# Patient Record
Sex: MALE | Race: OTHER | HISPANIC OR LATINO | URBAN - METROPOLITAN AREA
[De-identification: names, ages, dates, MRNs, and addresses within clinical notes are randomized per-mention and may not be internally consistent; named-entity substitution may affect disease eponyms.]

---

## 2019-08-22 ENCOUNTER — EMERGENCY (EMERGENCY)
Facility: HOSPITAL | Age: 24
LOS: 1 days | Discharge: HOME | End: 2019-08-22
Attending: EMERGENCY MEDICINE
Payer: SELF-PAY

## 2019-08-22 VITALS
DIASTOLIC BLOOD PRESSURE: 81 MMHG | RESPIRATION RATE: 20 BRPM | TEMPERATURE: 99 F | OXYGEN SATURATION: 99 % | SYSTOLIC BLOOD PRESSURE: 119 MMHG | HEIGHT: 67 IN | HEART RATE: 72 BPM | WEIGHT: 175.05 LBS

## 2019-08-22 PROCEDURE — 99282 EMERGENCY DEPT VISIT SF MDM: CPT

## 2019-08-22 PROCEDURE — T1013: CPT

## 2019-08-22 NOTE — ED PROVIDER NOTE - PHYSICAL EXAMINATION
Right hand: 2cm laceration to the right thumb, wound has healed/scabbed over, no active bleeding, distal sensation intact, cap refill <2sec, radial pulse 2+

## 2019-08-22 NOTE — ED PROVIDER NOTE - OBJECTIVE STATEMENT
25yo male with no significant PMHx comes in due to laceration. Pt reports he cut his thumb 23yo male with no significant PMHx comes in due to laceration. Pt reports he cut his right thumb on Sunday while cutting a piece of wood with a boxcutter. Pt thought it would heal on it's own but it hasn't. Pt reports he is UTD on tetanus. Pt denies f/c, n/v, decreased sensation, DROM.

## 2019-08-22 NOTE — ED ADULT NURSE NOTE - NSIMPLEMENTINTERV_GEN_ALL_ED
Implemented All Universal Safety Interventions:  Joaquin to call system. Call bell, personal items and telephone within reach. Instruct patient to call for assistance. Room bathroom lighting operational. Non-slip footwear when patient is off stretcher. Physically safe environment: no spills, clutter or unnecessary equipment. Stretcher in lowest position, wheels locked, appropriate side rails in place.

## 2019-08-22 NOTE — ED ADULT TRIAGE NOTE - CHIEF COMPLAINT QUOTE
cut right thumb while using  on Monday. states tetanus UTD Size Of Lesion In Cm: 0.2 Biopsy Method: Personna blade Curettage Text: The wound bed was treated with curettage after the biopsy was performed. Electrodesiccation Text: The wound bed was treated with electrodesiccation after the biopsy was performed. Destruction After The Procedure: Yes Type Of Destruction Used: Curettage Additional Anesthesia Volume In Cc (Will Not Render If 0): 0 Dressing: bandage Electrodesiccation And Curettage Text: The wound bed was treated with electrodesiccation and curettage after the biopsy was performed. Biopsy Type: H and E Render Post-Care Instructions In Note?: no Lab Facility: [id number] Silver Nitrate Text: The wound bed was treated with silver nitrate after the biopsy was performed. Wound Care: Vaseline Detail Level: Detailed Billing Type: Third-Party Bill Cryotherapy Text: The wound bed was treated with cryotherapy after the biopsy was performed. Lab: [id number] Path Notes (To The Dermatopathologist): 2mm Post-Care Instructions: I reviewed with the patient in detail post-care instructions. Patient is to keep the biopsy site dry overnight, and then apply bacitracin twice daily until healed. Patient may apply hydrogen peroxide soaks to remove any crusting. Consent: Verbal consent was obtained and risks were reviewed including but not limited to scarring, infection, bleeding, scabbing, incomplete removal, nerve damage and allergy to anesthesia. Hemostasis: Electrocautery Anesthesia Volume In Cc (Will Not Render If 0): 0.5 Anesthesia Type: 1% lidocaine with epinephrine and a 1:10 solution of 8.4% sodium bicarbonate Notification Instructions: Patient will be notified of biopsy results. However, patient instructed to call the office if not contacted within 2 weeks.

## 2019-08-22 NOTE — ED PROVIDER NOTE - CLINICAL SUMMARY MEDICAL DECISION MAKING FREE TEXT BOX
Pt comes in due to laceration of finger sustained 4 days ago, wound already healing. Wound irrigated and steri-strips applied. Pt educated on wound care and to follow up with PCP.

## 2019-08-22 NOTE — ED PROVIDER NOTE - ATTENDING CONTRIBUTION TO CARE
pt cut finger with  4 days ago.  miniml pain no pus, but can in due to laceration not closing  no pmhx  pe  ncat  r thumb with 1 cm laceration, clot in middel of wound edges  distal nv intact  nl cap refill from  imp laceration--too long since laceration occurred to close with sutures  will irrigate and close with steri strips

## 2022-12-31 ENCOUNTER — EMERGENCY (EMERGENCY)
Facility: HOSPITAL | Age: 27
LOS: 1 days | Discharge: HOME | End: 2022-12-31
Attending: STUDENT IN AN ORGANIZED HEALTH CARE EDUCATION/TRAINING PROGRAM
Payer: COMMERCIAL

## 2022-12-31 VITALS
RESPIRATION RATE: 18 BRPM | DIASTOLIC BLOOD PRESSURE: 71 MMHG | TEMPERATURE: 98 F | HEART RATE: 76 BPM | SYSTOLIC BLOOD PRESSURE: 135 MMHG | OXYGEN SATURATION: 99 %

## 2022-12-31 VITALS
TEMPERATURE: 98 F | RESPIRATION RATE: 18 BRPM | WEIGHT: 164.91 LBS | OXYGEN SATURATION: 98 % | DIASTOLIC BLOOD PRESSURE: 71 MMHG | SYSTOLIC BLOOD PRESSURE: 154 MMHG

## 2022-12-31 PROCEDURE — 93010 ELECTROCARDIOGRAM REPORT: CPT

## 2022-12-31 PROCEDURE — 99284 EMERGENCY DEPT VISIT MOD MDM: CPT

## 2022-12-31 NOTE — ED PROVIDER NOTE - OBJECTIVE STATEMENT
27y M w/ no significant PMH, BIBEMS for medical evaluation. Pt says he had been drinking beer at a club tonight, and while waiting for an Uber lost his balance and fell. Denies hitting his head or LOC. Denies any complaints at this time.

## 2022-12-31 NOTE — ED PROVIDER NOTE - PHYSICAL EXAMINATION
Constitutional: Awake, alert, in no acute distress  Eyes: no scleral icterus  HENT: normocephalic, atraumatic, moist oral mucosa  Neck: supple, no tenderness  CV: RRR, no murmur  Pulm: non-labored respirations, CTAB  Abdomen: soft, non-tender, non-distended  Extremities: no edema, no deformity  Skin: no rash, no jaundice  Neuro: AAOx3, moving all extremities equally, ambulatory with steady gait

## 2022-12-31 NOTE — ED PROVIDER NOTE - PATIENT PORTAL LINK FT
You can access the FollowMyHealth Patient Portal offered by [facility] by registering at the following website: [email or web address]. By joining [facility]’s FollowMyHealth portal, you will also be able to view your health information using other applications (apps) compatible with our system.

## 2022-12-31 NOTE — ED PROVIDER NOTE - NSFOLLOWUPINSTRUCTIONS_ED_ALL_ED_FT
Follow up with your primary care doctor.  Return to the emergency room for any new or worsening issues.

## 2022-12-31 NOTE — ED PROVIDER NOTE - CLINICAL SUMMARY MEDICAL DECISION MAKING FREE TEXT BOX
27y M presents for alcohol intoxication. States that he fell prior to arrival; however no signs of trauma on exam. Pt AAOx4, ambulatory with steady gait, clinically sober at this time. Stable for discharge.

## 2022-12-31 NOTE — ED ADULT TRIAGE NOTE - CHIEF COMPLAINT QUOTE
Patient BIBEMS after drinking too much alcohol at a club this evening. patient is A&Ox3 and states he wants to go home.

## 2023-01-02 PROCEDURE — 99285 EMERGENCY DEPT VISIT HI MDM: CPT

## 2023-01-02 PROCEDURE — 93005 ELECTROCARDIOGRAM TRACING: CPT

## 2023-05-31 ENCOUNTER — INPATIENT (INPATIENT)
Facility: HOSPITAL | Age: 28
LOS: 2 days | Discharge: HOME | DRG: 948 | End: 2023-06-03
Attending: FAMILY MEDICINE | Admitting: STUDENT IN AN ORGANIZED HEALTH CARE EDUCATION/TRAINING PROGRAM
Payer: COMMERCIAL

## 2023-05-31 PROCEDURE — 99285 EMERGENCY DEPT VISIT HI MDM: CPT

## 2023-06-01 VITALS
DIASTOLIC BLOOD PRESSURE: 77 MMHG | SYSTOLIC BLOOD PRESSURE: 136 MMHG | OXYGEN SATURATION: 98 % | TEMPERATURE: 98 F | RESPIRATION RATE: 18 BRPM | HEART RATE: 72 BPM | WEIGHT: 206.79 LBS

## 2023-06-01 DIAGNOSIS — E87.6 HYPOKALEMIA: ICD-10-CM

## 2023-06-01 PROBLEM — Z78.9 OTHER SPECIFIED HEALTH STATUS: Chronic | Status: ACTIVE | Noted: 2022-12-31

## 2023-06-01 LAB
A1C WITH ESTIMATED AVERAGE GLUCOSE RESULT: 5.2 % — SIGNIFICANT CHANGE UP (ref 4–5.6)
ALBUMIN SERPL ELPH-MCNC: 4.2 G/DL — SIGNIFICANT CHANGE UP (ref 3.3–5.2)
ALBUMIN SERPL ELPH-MCNC: 4.6 G/DL — SIGNIFICANT CHANGE UP (ref 3.3–5.2)
ALP SERPL-CCNC: 61 U/L — SIGNIFICANT CHANGE UP (ref 40–120)
ALP SERPL-CCNC: 72 U/L — SIGNIFICANT CHANGE UP (ref 40–120)
ALT FLD-CCNC: 41 U/L — HIGH
ALT FLD-CCNC: 48 U/L — HIGH
ANION GAP SERPL CALC-SCNC: 15 MMOL/L — SIGNIFICANT CHANGE UP (ref 5–17)
ANISOCYTOSIS BLD QL: SLIGHT — SIGNIFICANT CHANGE UP
APTT BLD: 27.6 SEC — SIGNIFICANT CHANGE UP (ref 27.5–35.5)
AST SERPL-CCNC: 26 U/L — SIGNIFICANT CHANGE UP
AST SERPL-CCNC: 27 U/L — SIGNIFICANT CHANGE UP
BASOPHILS # BLD AUTO: 0 K/UL — SIGNIFICANT CHANGE UP (ref 0–0.2)
BASOPHILS NFR BLD AUTO: 0 % — SIGNIFICANT CHANGE UP (ref 0–2)
BILIRUB SERPL-MCNC: 0.4 MG/DL — SIGNIFICANT CHANGE UP (ref 0.4–2)
BILIRUB SERPL-MCNC: 0.6 MG/DL — SIGNIFICANT CHANGE UP (ref 0.4–2)
BUN SERPL-MCNC: 12.6 MG/DL — SIGNIFICANT CHANGE UP (ref 8–20)
BUN SERPL-MCNC: 12.9 MG/DL — SIGNIFICANT CHANGE UP (ref 8–20)
CALCIUM SERPL-MCNC: 8.5 MG/DL — SIGNIFICANT CHANGE UP (ref 8.4–10.5)
CALCIUM SERPL-MCNC: 9 MG/DL — SIGNIFICANT CHANGE UP (ref 8.4–10.5)
CHLORIDE SERPL-SCNC: 101 MMOL/L — SIGNIFICANT CHANGE UP (ref 96–108)
CHLORIDE SERPL-SCNC: 104 MMOL/L — SIGNIFICANT CHANGE UP (ref 96–108)
CK MB CFR SERPL CALC: 4 NG/ML — SIGNIFICANT CHANGE UP (ref 0–6.7)
CK SERPL-CCNC: 507 U/L — HIGH (ref 30–200)
CO2 SERPL-SCNC: 21 MMOL/L — LOW (ref 22–29)
CO2 SERPL-SCNC: 21 MMOL/L — LOW (ref 22–29)
CREAT SERPL-MCNC: 0.7 MG/DL — SIGNIFICANT CHANGE UP (ref 0.5–1.3)
CREAT SERPL-MCNC: 0.82 MG/DL — SIGNIFICANT CHANGE UP (ref 0.5–1.3)
EGFR: 123 ML/MIN/1.73M2 — SIGNIFICANT CHANGE UP
EGFR: 129 ML/MIN/1.73M2 — SIGNIFICANT CHANGE UP
EOSINOPHIL # BLD AUTO: 0 K/UL — SIGNIFICANT CHANGE UP (ref 0–0.5)
EOSINOPHIL NFR BLD AUTO: 0 % — SIGNIFICANT CHANGE UP (ref 0–6)
ESTIMATED AVERAGE GLUCOSE: 103 MG/DL — SIGNIFICANT CHANGE UP (ref 68–114)
GLUCOSE SERPL-MCNC: 125 MG/DL — HIGH (ref 70–99)
GLUCOSE SERPL-MCNC: 177 MG/DL — HIGH (ref 70–99)
HCT VFR BLD CALC: 45.2 % — SIGNIFICANT CHANGE UP (ref 39–50)
HGB BLD-MCNC: 16.3 G/DL — SIGNIFICANT CHANGE UP (ref 13–17)
INR BLD: 1.17 RATIO — HIGH (ref 0.88–1.16)
LYMPHOCYTES # BLD AUTO: 10.2 % — LOW (ref 13–44)
LYMPHOCYTES # BLD AUTO: 3.33 K/UL — HIGH (ref 1–3.3)
MAGNESIUM SERPL-MCNC: 2 MG/DL — SIGNIFICANT CHANGE UP (ref 1.6–2.6)
MANUAL SMEAR VERIFICATION: SIGNIFICANT CHANGE UP
MCHC RBC-ENTMCNC: 31 PG — SIGNIFICANT CHANGE UP (ref 27–34)
MCHC RBC-ENTMCNC: 36.1 GM/DL — HIGH (ref 32–36)
MCV RBC AUTO: 85.9 FL — SIGNIFICANT CHANGE UP (ref 80–100)
MONOCYTES # BLD AUTO: 1.21 K/UL — HIGH (ref 0–0.9)
MONOCYTES NFR BLD AUTO: 3.7 % — SIGNIFICANT CHANGE UP (ref 2–14)
NEUTROPHILS # BLD AUTO: 27.19 K/UL — HIGH (ref 1.8–7.4)
NEUTROPHILS NFR BLD AUTO: 83.3 % — HIGH (ref 43–77)
PHOSPHATE SERPL-MCNC: 1.9 MG/DL — LOW (ref 2.4–4.7)
PLAT MORPH BLD: NORMAL — SIGNIFICANT CHANGE UP
PLATELET # BLD AUTO: 363 K/UL — SIGNIFICANT CHANGE UP (ref 150–400)
POLYCHROMASIA BLD QL SMEAR: SIGNIFICANT CHANGE UP
POTASSIUM SERPL-MCNC: 2.3 MMOL/L — CRITICAL LOW (ref 3.5–5.3)
POTASSIUM SERPL-MCNC: 2.7 MMOL/L — CRITICAL LOW (ref 3.5–5.3)
POTASSIUM SERPL-MCNC: 4 MMOL/L — SIGNIFICANT CHANGE UP (ref 3.5–5.3)
POTASSIUM SERPL-SCNC: 2.3 MMOL/L — CRITICAL LOW (ref 3.5–5.3)
POTASSIUM SERPL-SCNC: 2.7 MMOL/L — CRITICAL LOW (ref 3.5–5.3)
POTASSIUM SERPL-SCNC: 4 MMOL/L — SIGNIFICANT CHANGE UP (ref 3.5–5.3)
PROT SERPL-MCNC: 7.1 G/DL — SIGNIFICANT CHANGE UP (ref 6.6–8.7)
PROT SERPL-MCNC: 7.6 G/DL — SIGNIFICANT CHANGE UP (ref 6.6–8.7)
PROTHROM AB SERPL-ACNC: 13.6 SEC — HIGH (ref 10.5–13.4)
RBC # BLD: 5.26 M/UL — SIGNIFICANT CHANGE UP (ref 4.2–5.8)
RBC # FLD: 12 % — SIGNIFICANT CHANGE UP (ref 10.3–14.5)
RBC BLD AUTO: ABNORMAL
SMUDGE CELLS # BLD: PRESENT — SIGNIFICANT CHANGE UP
SODIUM SERPL-SCNC: 138 MMOL/L — SIGNIFICANT CHANGE UP (ref 135–145)
SODIUM SERPL-SCNC: 140 MMOL/L — SIGNIFICANT CHANGE UP (ref 135–145)
VARIANT LYMPHS # BLD: 2.8 % — SIGNIFICANT CHANGE UP (ref 0–6)
WBC # BLD: 32.64 K/UL — HIGH (ref 3.8–10.5)
WBC # FLD AUTO: 32.64 K/UL — HIGH (ref 3.8–10.5)

## 2023-06-01 PROCEDURE — 99223 1ST HOSP IP/OBS HIGH 75: CPT

## 2023-06-01 PROCEDURE — G1004: CPT

## 2023-06-01 PROCEDURE — 70450 CT HEAD/BRAIN W/O DYE: CPT | Mod: 26,MG

## 2023-06-01 PROCEDURE — 93010 ELECTROCARDIOGRAM REPORT: CPT | Mod: 76

## 2023-06-01 PROCEDURE — 93306 TTE W/DOPPLER COMPLETE: CPT | Mod: 26

## 2023-06-01 RX ORDER — POTASSIUM CHLORIDE 20 MEQ
10 PACKET (EA) ORAL
Refills: 0 | Status: COMPLETED | OUTPATIENT
Start: 2023-06-01 | End: 2023-06-01

## 2023-06-01 RX ORDER — ACETAMINOPHEN 500 MG
1000 TABLET ORAL ONCE
Refills: 0 | Status: COMPLETED | OUTPATIENT
Start: 2023-06-01 | End: 2023-06-01

## 2023-06-01 RX ORDER — ATORVASTATIN CALCIUM 80 MG/1
80 TABLET, FILM COATED ORAL AT BEDTIME
Refills: 0 | Status: DISCONTINUED | OUTPATIENT
Start: 2023-06-01 | End: 2023-06-03

## 2023-06-01 RX ORDER — POTASSIUM PHOSPHATE, MONOBASIC POTASSIUM PHOSPHATE, DIBASIC 236; 224 MG/ML; MG/ML
30 INJECTION, SOLUTION INTRAVENOUS ONCE
Refills: 0 | Status: COMPLETED | OUTPATIENT
Start: 2023-06-01 | End: 2023-06-01

## 2023-06-01 RX ORDER — MAGNESIUM SULFATE 500 MG/ML
2 VIAL (ML) INJECTION ONCE
Refills: 0 | Status: COMPLETED | OUTPATIENT
Start: 2023-06-01 | End: 2023-06-01

## 2023-06-01 RX ORDER — ASPIRIN/CALCIUM CARB/MAGNESIUM 324 MG
81 TABLET ORAL DAILY
Refills: 0 | Status: DISCONTINUED | OUTPATIENT
Start: 2023-06-01 | End: 2023-06-02

## 2023-06-01 RX ORDER — PANTOPRAZOLE SODIUM 20 MG/1
40 TABLET, DELAYED RELEASE ORAL
Refills: 0 | Status: DISCONTINUED | OUTPATIENT
Start: 2023-06-01 | End: 2023-06-03

## 2023-06-01 RX ORDER — SODIUM CHLORIDE 9 MG/ML
1000 INJECTION, SOLUTION INTRAVENOUS
Refills: 0 | Status: DISCONTINUED | OUTPATIENT
Start: 2023-06-01 | End: 2023-06-03

## 2023-06-01 RX ORDER — POTASSIUM CHLORIDE 20 MEQ
40 PACKET (EA) ORAL ONCE
Refills: 0 | Status: COMPLETED | OUTPATIENT
Start: 2023-06-01 | End: 2023-06-01

## 2023-06-01 RX ORDER — POTASSIUM CHLORIDE 20 MEQ
40 PACKET (EA) ORAL EVERY 4 HOURS
Refills: 0 | Status: COMPLETED | OUTPATIENT
Start: 2023-06-01 | End: 2023-06-01

## 2023-06-01 RX ORDER — KETOROLAC TROMETHAMINE 30 MG/ML
30 SYRINGE (ML) INJECTION ONCE
Refills: 0 | Status: DISCONTINUED | OUTPATIENT
Start: 2023-06-01 | End: 2023-06-01

## 2023-06-01 RX ADMIN — Medication 40 MILLIEQUIVALENT(S): at 11:41

## 2023-06-01 RX ADMIN — SODIUM CHLORIDE 75 MILLILITER(S): 9 INJECTION, SOLUTION INTRAVENOUS at 14:17

## 2023-06-01 RX ADMIN — Medication 81 MILLIGRAM(S): at 11:41

## 2023-06-01 RX ADMIN — POTASSIUM PHOSPHATE, MONOBASIC POTASSIUM PHOSPHATE, DIBASIC 83.33 MILLIMOLE(S): 236; 224 INJECTION, SOLUTION INTRAVENOUS at 12:04

## 2023-06-01 RX ADMIN — Medication 100 MILLIEQUIVALENT(S): at 05:00

## 2023-06-01 RX ADMIN — Medication 100 MILLIEQUIVALENT(S): at 06:01

## 2023-06-01 RX ADMIN — Medication 100 MILLIEQUIVALENT(S): at 08:48

## 2023-06-01 RX ADMIN — Medication 30 MILLIGRAM(S): at 00:53

## 2023-06-01 RX ADMIN — Medication 400 MILLIGRAM(S): at 01:17

## 2023-06-01 RX ADMIN — PANTOPRAZOLE SODIUM 40 MILLIGRAM(S): 20 TABLET, DELAYED RELEASE ORAL at 11:41

## 2023-06-01 RX ADMIN — Medication 40 MILLIEQUIVALENT(S): at 14:18

## 2023-06-01 RX ADMIN — Medication 25 GRAM(S): at 05:00

## 2023-06-01 RX ADMIN — Medication 40 MILLIEQUIVALENT(S): at 05:00

## 2023-06-01 RX ADMIN — ATORVASTATIN CALCIUM 80 MILLIGRAM(S): 80 TABLET, FILM COATED ORAL at 21:34

## 2023-06-01 NOTE — ED PROVIDER NOTE - ALLERGIC/IMMUNOLOGIC NEGATIVE STATEMENT, MLM
FBS wnl but A1C in IFG range, urged low sugar/carb diet and exercise and mild wgt loss, recheck in 6 mos no dermatitis, no environmental allergies, no food allergies, no immunosuppressive disorder, and no pruritus.

## 2023-06-01 NOTE — OCCUPATIONAL THERAPY INITIAL EVALUATION ADULT - RANGE OF MOTION EXAMINATION, UPPER EXTREMITY
decreased AROM at all planes of shoulder movement/bilateral UE Passive ROM was WNL (within normal limits)

## 2023-06-01 NOTE — ED ADULT NURSE REASSESSMENT NOTE - NS ED NURSE REASSESS COMMENT FT1
pt is stable, placed on monitor,, vitals are within normal limits, repOrt given to CDU nurse Saba , safety maintained

## 2023-06-01 NOTE — ED PROVIDER NOTE - CLINICAL SUMMARY MEDICAL DECISION MAKING FREE TEXT BOX
patient is leukocytosis with neutrophil predominance noted likely secondary to his prednisone use however I cannot explain the hypokalemia he has no vomiting no diarrhea no diuretic use and is associated with a prolonged QT QTc of 618 ms patient still with right upper extremity weakness likely secondary to the hypokalemia versus steroid myopathy based on prolonged QT need for vast electrolyte replacement will require hospital admission for correction of QTc and monitoring of his right upper extremity weakness patient and patient's significant other at bedside agree this plan of care

## 2023-06-01 NOTE — H&P ADULT - HISTORY OF PRESENT ILLNESS
27 yr old M with Alcohol use went to [facility] on 5/[facility] with cough and was given tessalon and prednisone. He has been taking it. Now presents with c/o weakness in b/l legs and R arm d/t pain. pt also c/o lower abd pain. Per significant other  pt was told he had something wrong with his liver, in the past. Pain started around last evening when he was coming home from work and wasn't able to drive and needed help showering when he got home.   In ED- NIH 2, CTH neg. treated as steroid myopathy.    SH- Alcoholism, denies other habits  FH-  27 yr old M with Alcohol use went to [facility] on 5/21 with cough and was given tessalon and prednisone. He has been taking it. Now presents with c/o weakness in b/l legs and R arm d/t pain. pt also c/o lower abd pain. Per significant other  pt was told he had something wrong with his liver, in the past. Pain started around last evening when he was coming home from work and wasn't able to drive and needed help showering when he got home.   In ED- NIH 2, CTH neg. treated as steroid myopathy.    SH- Alcoholism, denies other habits  FH- Denies medical condition in family

## 2023-06-01 NOTE — ED ADULT TRIAGE NOTE - CHIEF COMPLAINT QUOTE
[facility] for sob, cough, given tessalon, prednisone. pt also c/o R sided upper and lower extremity weakness with numbness at 2pm

## 2023-06-01 NOTE — ED ADULT NURSE NOTE - OBJECTIVE STATEMENT
pt c/o weakness in b/l legs and R arm d/t pain. pt also c/o lower abd pain. significant other states she thinks it's his liver because pt is taking decadron & tessalon pearls & in past pt was told he had something wrong with his liver. pain started around 6pm today when he was coming home from work and wasn't able to drive and needed help showering when he got home. pt is A&Ox4, brought to CT for priority CT

## 2023-06-01 NOTE — H&P ADULT - ASSESSMENT
27 yr old M with Alcohol use went to [facility] on 5/21 with cough and was given tessalon and prednisone. He has been taking it. Now presents with c/o weakness in b/l legs and R arm d/t pain. pt also c/o lower abd pain. Per significant other  pt was told he had something wrong with his liver, in the past. Pain started around last evening when he was coming home from work and wasn't able to drive and needed help showering when he got home.   In ED- NIH 2, CTH neg. treated as steroid myopathy.    # Right sided weakness  CTH neg  stroke protocol until stroke ruled out  MRI, ECHO  CK acceptable    # ? Steroid myopathy  but no contraction alkalosis or other common findings  on steroid since 5/21  now off  Monitor    # Hypokalemia with EKG changes  QTc 648  received 40 Po, 2 Mg, 30 IV  STAT BMP- then replete as needed  STAT EKG    # Leucocytosis with left shift  likely sec to steroids  Trend for improvement  no clinical signs of inf process    # Lovenox 27 yr old M with Alcohol use went to [facility] on 5/[facility] with cough and was given tessalon and prednisone. He has been taking it. Now presents with c/o weakness in b/l legs and R arm d/t pain. pt also c/o lower abd pain. Per significant other  pt was told he had something wrong with his liver, in the past. Pain started around last evening when he was coming home from work and wasn't able to drive and needed help showering when he got home.   In ED- NIH 2, CTH neg. treated as steroid myopathy.    # Right sided weakness  CTH neg  stroke protocol until stroke ruled out  MRI, ECHO  CK acceptable    # ? Steroid myopathy  but no contraction alkalosis or other common findings  on steroid since 5/21  now off  Monitor    # Hypokalemia with EKG changes, Hypophosphatemia  QTc 648  received 40 Po, 2 Mg, 30 IV  STAT BMP- then replete as needed  STAT EKG- improved QTc    # Leucocytosis with left shift  likely sec to steroids  Trend for improvement  no clinical signs of inf process    # Metabolic acidosis  likely sec to alcoholism  IVF x 24 hrs  then intervene if not resolved    # Lovenox

## 2023-06-01 NOTE — ED PROVIDER NOTE - OBJECTIVE STATEMENT
Patient presents to ED with 1 day of right upper extremity weakness not feeling right.  He has no history of vasculitis.  No headache or blurry vision no gait ataxia.  Of note patient was placed on prednisone and cough suppressant 4 days prior for viral infection diagnosed by an urgent care.  He has been compliant with his medications.  No chest pain or shortness of breath shortness of breath.  No abdominal pain no nausea vomiting diarrhea.  No diuretic use.  No sensory deficits.  Denies any drug use

## 2023-06-01 NOTE — OCCUPATIONAL THERAPY INITIAL EVALUATION ADULT - PERTINENT HX OF CURRENT PROBLEM, REHAB EVAL
As per MD note: 27 yr old M with Alcohol use went to [facility] 5/[facility] with cough and was given tessalon and prednisone. He has been taking it. Now presents with c/o weakness in b/l legs and R arm d/t pain. pt also c/o lower abd pain. Per significant other  pt was told he had something wrong with his liver, in the past. Pain started around last evening when he was coming home from work and wasn't able to drive and needed help showering when he got home.   In ED- NIH 2, CTH neg. treated as steroid myopathy.

## 2023-06-01 NOTE — ED PROVIDER NOTE - PHYSICAL EXAMINATION
neuro: CN II - XII grossly intact, EOMI, PERRL, 5/5 muscle strength x 3 LUE LLE RLE extremities, 3/5 RUE strength  no sensory deficits, dtr grossly intact, no ataxic gait,

## 2023-06-01 NOTE — PATIENT PROFILE ADULT - FALL HARM RISK - HARM RISK INTERVENTIONS

## 2023-06-01 NOTE — H&P ADULT - NSHPPHYSICALEXAM_GEN_ALL_CORE
Vital Signs Last 24 Hrs  T(C): 36.9 (06-01-23 @ 06:32), Max: 36.9 (06-01-23 @ 06:32)  T(F): 98.5 (06-01-23 @ 06:32), Max: 98.5 (06-01-23 @ 06:32)  HR: 58 (06-01-23 @ 06:32) (58 - 72)  BP: 128/68 (06-01-23 @ 06:32) (128/68 - 136/77)  BP(mean): --  RR: 18 (06-01-23 @ 06:32) (18 - 18)  SpO2: 100% (06-01-23 @ 06:32) (98% - 100%)    GENERAL: NAD, well-groomed, well-developed  HEAD:  Atraumatic, Normocephalic  EYES: EOMI, PERRLA, conjunctiva and sclera clear  NECK: Supple, No JVD, Normal thyroid  NERVOUS SYSTEM:  Alert & Oriented X3, Motor Strength 5/5 B/L upper and lower extremities; DTRs 2+ intact and symmetric  CHEST/LUNG: Clear to percussion bilaterally; No rales, rhonchi, wheezing, or rubs  HEART: Regular rate and rhythm; No murmurs, rubs, or gallops  ABDOMEN: Soft, Nontender, Nondistended; Bowel sounds present  EXTREMITIES:  2+ Peripheral Pulses, No clubbing, cyanosis, or edema  SKIN: No rashes or lesions Vital Signs Last 24 Hrs  T(C): 36.9 (06-01-23 @ 06:32), Max: 36.9 (06-01-23 @ 06:32)  T(F): 98.5 (06-01-23 @ 06:32), Max: 98.5 (06-01-23 @ 06:32)  HR: 58 (06-01-23 @ 06:32) (58 - 72)  BP: 128/68 (06-01-23 @ 06:32) (128/68 - 136/77)  BP(mean): --  RR: 18 (06-01-23 @ 06:32) (18 - 18)  SpO2: 100% (06-01-23 @ 06:32) (98% - 100%)    GENERAL: Obese, comfortable, no distress, c/o right arm pain  HEAD:  Atraumatic, Normocephalic  EYES: EOMI, PERRLA, conjunctiva and sclera clear  NECK: Supple, No JVD, Normal thyroid  NERVOUS SYSTEM:  Alert & Oriented X 3, weak  right hand, Right UE 3/5, LUE 5./5; Vinay LE 5/5 and no sensory deficits, speech, language, facial- intact  CHEST/LUNG: CTA bilaterally; No rales, rhonchi, wheezing, or rubs  HEART: Regular rate and rhythm; No murmurs, rubs, or gallops  ABDOMEN: Soft, Nontender, Nondistended; Bowel sounds present  EXTREMITIES:  2+ Peripheral Pulses, No clubbing, cyanosis, or edema  SKIN: No rashes or lesions

## 2023-06-01 NOTE — ED ADULT NURSE NOTE - NSFALLRISKINTERV_ED_ALL_ED
Assistance OOB with selected safe patient handling equipment if applicable/Assistance with ambulation/Communicate fall risk and risk factors to all staff, patient, and family/Monitor gait and stability/Provide visual cue: yellow wristband, yellow gown, etc/Reinforce activity limits and safety measures with patient and family/Call bell, personal items and telephone in reach/Instruct patient to call for assistance before getting out of bed/chair/stretcher/Non-slip footwear applied when patient is off stretcher/Geraldine to call system/Physically safe environment - no spills, clutter or unnecessary equipment/Purposeful Proactive Rounding/Room/bathroom lighting operational, light cord in reach

## 2023-06-02 LAB
ANION GAP SERPL CALC-SCNC: 12 MMOL/L — SIGNIFICANT CHANGE UP (ref 5–17)
BASOPHILS # BLD AUTO: 0 K/UL — SIGNIFICANT CHANGE UP (ref 0–0.2)
BASOPHILS NFR BLD AUTO: 0 % — SIGNIFICANT CHANGE UP (ref 0–2)
BUN SERPL-MCNC: 11.5 MG/DL — SIGNIFICANT CHANGE UP (ref 8–20)
CALCIUM SERPL-MCNC: 9.3 MG/DL — SIGNIFICANT CHANGE UP (ref 8.4–10.5)
CHLORIDE SERPL-SCNC: 103 MMOL/L — SIGNIFICANT CHANGE UP (ref 96–108)
CHOLEST SERPL-MCNC: 168 MG/DL — SIGNIFICANT CHANGE UP
CO2 SERPL-SCNC: 25 MMOL/L — SIGNIFICANT CHANGE UP (ref 22–29)
CREAT SERPL-MCNC: 0.7 MG/DL — SIGNIFICANT CHANGE UP (ref 0.5–1.3)
EGFR: 129 ML/MIN/1.73M2 — SIGNIFICANT CHANGE UP
EOSINOPHIL # BLD AUTO: 1.15 K/UL — HIGH (ref 0–0.5)
EOSINOPHIL NFR BLD AUTO: 7.8 % — HIGH (ref 0–6)
GIANT PLATELETS BLD QL SMEAR: PRESENT — SIGNIFICANT CHANGE UP
GLUCOSE SERPL-MCNC: 99 MG/DL — SIGNIFICANT CHANGE UP (ref 70–99)
HCT VFR BLD CALC: 42.7 % — SIGNIFICANT CHANGE UP (ref 39–50)
HCT VFR BLD CALC: 43.7 % — SIGNIFICANT CHANGE UP (ref 39–50)
HDLC SERPL-MCNC: 33 MG/DL — LOW
HGB BLD-MCNC: 14.7 G/DL — SIGNIFICANT CHANGE UP (ref 13–17)
HGB BLD-MCNC: 14.7 G/DL — SIGNIFICANT CHANGE UP (ref 13–17)
LIPID PNL WITH DIRECT LDL SERPL: 85 MG/DL — SIGNIFICANT CHANGE UP
LYMPHOCYTES # BLD AUTO: 27.8 % — SIGNIFICANT CHANGE UP (ref 13–44)
LYMPHOCYTES # BLD AUTO: 4.09 K/UL — HIGH (ref 1–3.3)
MAGNESIUM SERPL-MCNC: 1.9 MG/DL — SIGNIFICANT CHANGE UP (ref 1.6–2.6)
MANUAL SMEAR VERIFICATION: SIGNIFICANT CHANGE UP
MCHC RBC-ENTMCNC: 30.2 PG — SIGNIFICANT CHANGE UP (ref 27–34)
MCHC RBC-ENTMCNC: 30.3 PG — SIGNIFICANT CHANGE UP (ref 27–34)
MCHC RBC-ENTMCNC: 33.6 GM/DL — SIGNIFICANT CHANGE UP (ref 32–36)
MCHC RBC-ENTMCNC: 34.4 GM/DL — SIGNIFICANT CHANGE UP (ref 32–36)
MCV RBC AUTO: 87.7 FL — SIGNIFICANT CHANGE UP (ref 80–100)
MCV RBC AUTO: 90.1 FL — SIGNIFICANT CHANGE UP (ref 80–100)
MONOCYTES # BLD AUTO: 0.9 K/UL — SIGNIFICANT CHANGE UP (ref 0–0.9)
MONOCYTES NFR BLD AUTO: 6.1 % — SIGNIFICANT CHANGE UP (ref 2–14)
MYELOCYTES NFR BLD: 0.9 % — HIGH (ref 0–0)
NEUTROPHILS # BLD AUTO: 7.93 K/UL — HIGH (ref 1.8–7.4)
NEUTROPHILS NFR BLD AUTO: 53.9 % — SIGNIFICANT CHANGE UP (ref 43–77)
NON HDL CHOLESTEROL: 135 MG/DL — HIGH
PHOSPHATE SERPL-MCNC: 4.3 MG/DL — SIGNIFICANT CHANGE UP (ref 2.4–4.7)
PLAT MORPH BLD: NORMAL — SIGNIFICANT CHANGE UP
PLATELET # BLD AUTO: 299 K/UL — SIGNIFICANT CHANGE UP (ref 150–400)
PLATELET # BLD AUTO: 299 K/UL — SIGNIFICANT CHANGE UP (ref 150–400)
POLYCHROMASIA BLD QL SMEAR: SLIGHT — SIGNIFICANT CHANGE UP
POTASSIUM SERPL-MCNC: 4.7 MMOL/L — SIGNIFICANT CHANGE UP (ref 3.5–5.3)
POTASSIUM SERPL-SCNC: 4.7 MMOL/L — SIGNIFICANT CHANGE UP (ref 3.5–5.3)
RBC # BLD: 4.85 M/UL — SIGNIFICANT CHANGE UP (ref 4.2–5.8)
RBC # BLD: 4.87 M/UL — SIGNIFICANT CHANGE UP (ref 4.2–5.8)
RBC # FLD: 12.8 % — SIGNIFICANT CHANGE UP (ref 10.3–14.5)
RBC # FLD: 12.9 % — SIGNIFICANT CHANGE UP (ref 10.3–14.5)
RBC BLD AUTO: ABNORMAL
SMUDGE CELLS # BLD: PRESENT — SIGNIFICANT CHANGE UP
SODIUM SERPL-SCNC: 140 MMOL/L — SIGNIFICANT CHANGE UP (ref 135–145)
TRIGL SERPL-MCNC: 249 MG/DL — HIGH
VARIANT LYMPHS # BLD: 3.5 % — SIGNIFICANT CHANGE UP (ref 0–6)
WBC # BLD: 11.72 K/UL — HIGH (ref 3.8–10.5)
WBC # BLD: 14.71 K/UL — HIGH (ref 3.8–10.5)
WBC # FLD AUTO: 11.72 K/UL — HIGH (ref 3.8–10.5)
WBC # FLD AUTO: 14.71 K/UL — HIGH (ref 3.8–10.5)

## 2023-06-02 PROCEDURE — 99221 1ST HOSP IP/OBS SF/LOW 40: CPT

## 2023-06-02 PROCEDURE — 72128 CT CHEST SPINE W/O DYE: CPT | Mod: 26

## 2023-06-02 PROCEDURE — 70551 MRI BRAIN STEM W/O DYE: CPT | Mod: 26

## 2023-06-02 PROCEDURE — 93010 ELECTROCARDIOGRAM REPORT: CPT

## 2023-06-02 PROCEDURE — 99233 SBSQ HOSP IP/OBS HIGH 50: CPT

## 2023-06-02 PROCEDURE — 72131 CT LUMBAR SPINE W/O DYE: CPT | Mod: 26

## 2023-06-02 PROCEDURE — 71260 CT THORAX DX C+: CPT | Mod: 26

## 2023-06-02 PROCEDURE — 72125 CT NECK SPINE W/O DYE: CPT | Mod: 26

## 2023-06-02 PROCEDURE — 74177 CT ABD & PELVIS W/CONTRAST: CPT | Mod: 26

## 2023-06-02 RX ADMIN — ATORVASTATIN CALCIUM 80 MILLIGRAM(S): 80 TABLET, FILM COATED ORAL at 21:26

## 2023-06-02 RX ADMIN — PANTOPRAZOLE SODIUM 40 MILLIGRAM(S): 20 TABLET, DELAYED RELEASE ORAL at 05:22

## 2023-06-02 RX ADMIN — Medication 81 MILLIGRAM(S): at 07:42

## 2023-06-02 RX ADMIN — SODIUM CHLORIDE 75 MILLILITER(S): 9 INJECTION, SOLUTION INTRAVENOUS at 05:30

## 2023-06-02 NOTE — CONSULT NOTE ADULT - SUBJECTIVE AND OBJECTIVE BOX
TRAUMA SERVICE (Acute Care Surgery / ACS - ) - CONSULT NOTE  --------------------------------------------------------------------------------------------    TRAUMA ACTIVATION LEVEL:     MECHANISM OF INJURY:      [] Blunt  	[] MVC	[] Fall	[] Pedestrian Struck	[] Motorcycle accident      [] Penetrating  	[] Gun Shot Wound 		[] Stab Wound    GCS: 	E: 4	V: 5	M: 6      HPI:   Patient is a 27y old  Male who presents with a chief complaint of Right sided weakness (02 Jun 2023 09:50)    HPI:  27 yr old M with Alcohol use went to [facility] on 5/21 with cough and was given tessalon and prednisone. He has been taking it. Now presents with c/o weakness in b/l legs and R arm d/t pain. pt also c/o lower abd pain. Per significant other  pt was told he had something wrong with his liver, in the past. Pain started around last evening when he was coming home from work and wasn't able to drive and needed help showering when he got home.   In ED- NIH 2, CTH neg. treated as steroid myopathy.    SH- Alcoholism, denies other habits  FH- Denies medical condition in family (01 Jun 2023 08:47)    ***    Primary Survey:  ***  A - airway intact  B - bilateral breath sounds and good chest rise  C - initial BP: 113/60 (06-02-23 @ 12:17) , HR: 72 (06-02-23 @ 12:17) , palpable pulses in all extremities  D - GCS 15 on arrival  Exposure obtained      Secondary Survey: ***  General: NAD  HEENT: Normocephalic, atraumatic, EOMI, PEERLA.  Neck: Soft, midline trachea, C-collar in place  Chest: No chest wall tenderness.   Cardiac: S1, S2, RRR  Respiratory: Bilateral breath sounds, clear and equal bilaterally  Abdomen: Soft, non-distended, non-tender, no rebound, no guarding, no masses palpated  Pelvis: Stable, non-tender, no ecchymosis  Ext: palp radial b/l UE, b/l DP palp in Lower Extrem, motor and sensory grossly intact in all 4 extremities  Back: no TTP, no palpable runoff/stepoff/deformity  Rectal: No harsh blood, EDUARDO with good tone    Patient denies fevers/chills, denies lightheadedness/dizziness, denies SOB/chest pain, denies nausea/vomiting, denies constipation/diarrhea.  ***    ROS: 10-system review is otherwise negative except HPI above.      PAST MEDICAL & SURGICAL HISTORY:  No pertinent past medical history        FAMILY HISTORY:  No pertinent family history in first degree relatives      [] Family history not pertinent as reviewed with the patient and family    SOCIAL HISTORY:  ***    ALLERGIES: No Known Allergies      HOME MEDICATIONS: ***    CURRENT MEDICATIONS  MEDICATIONS (STANDING): atorvastatin 80 milliGRAM(s) Oral at bedtime  lactated ringers 1000 milliLiter(s) IV Continuous <Continuous>  pantoprazole    Tablet 40 milliGRAM(s) Oral before breakfast    MEDICATIONS (PRN):LORazepam   Injectable 2 milliGRAM(s) IV Push every 1 hour PRN CIWA-Ar score 8 or greater  oxycodone    5 mG/acetaminophen 325 mG 1 Tablet(s) Oral every 6 hours PRN Moderate Pain (4 - 6)    --------------------------------------------------------------------------------------------    Vitals:   T(C): 36.7 (06-02-23 @ 12:17), Max: 36.7 (06-02-23 @ 12:17)  HR: 72 (06-02-23 @ 12:17) (67 - 80)  BP: 113/60 (06-02-23 @ 12:17) (100/65 - 132/78)  RR: 18 (06-02-23 @ 12:17) (16 - 18)  SpO2: 97% (06-02-23 @ 12:17) (95% - 97%)  CAPILLARY BLOOD GLUCOSE        CAPILLARY BLOOD GLUCOSE          06-01 @ 07:01 - 06-02 @ 07:00  --------------------------------------------------------  IN:    Lactated Ringers w/ Additives: 750 mL  Total IN: 750 mL    OUT:  Total OUT: 0 mL    Total NET: 750 mL      Weight (kg): 93.8 (06-01 @ 01:11)    PHYSICAL EXAM:   General: NAD  HEENT: Normocephalic, atraumatic  Neck: Soft, midline trachea.  Chest: No chest wall tenderness.   Cardiac: S1, S2, RRR  Respiratory: Bilateral breath sounds, clear and equal bilaterally  Abdomen: Soft, non-distended, tender to palpation in lower abdomen, no rebound, no guarding  Pelvis: Stable, non-tender, no ecchymosis  Ext: palp radial b/l UE, b/l DP/PT, proximal BUE and BLE tender to palpation  Back: tender to palpation in low back, no palpable runoff/stepoff/deformity    --------------------------------------------------------------------------------------------    LABS  CBC (06-02 @ 10:24)                              14.7                           11.72<H>  )----------------(  299        --    % Neutrophils, --    % Lymphocytes, ANC: --                                  42.7    CBC (06-02 @ 05:07)                              14.7                           14.71<H>  )----------------(  299        53.9  % Neutrophils, 27.8  % Lymphocytes, ANC: 7.93<H>                              43.7      BMP (06-02 @ 05:07)             140     |  103     |  11.5  		Ca++ --      Ca 9.3                ---------------------------------( 99    		Mg 1.9                4.7     |  25.0    |  0.70  			Ph 4.3     BMP (06-01 @ 17:30)             --      |  --      |  --    		Ca++ --      Ca --                 ---------------------------------( --    		Mg --                 4.0     |  --      |  --    			Ph --        LFTs (06-01 @ 09:15)      TPro 7.1 / Alb 4.2 / TBili 0.4 / DBili -- / AST 26 / ALT 41<H> / AlkPhos 61    --------------------------------------------------------------------------------------------    IMAGING  < from: CT Head No Cont (06.01.23 @ 00:26) >  FINDINGS:    There is generalized volume loss, out of proportion to the patient's age.   This is nonspecific. There is no sulcal effacement. There is no   intracranial hemorrhage, extra axial fluid collection, mass effect or   midline shift. There is no acute large vessel territorial infarct. The   skull is intact. The paranasal sinuses and mastoid air cells are clear.      IMPRESSION: No acute intracranial pathology.  Generalized atrophy, out of proportion to the patient's age. This is   nonspecific and of uncertain significance. Consider dedicated neurologic   assessment.    < end of copied text >    --------------------------------------------------------------------------------------------

## 2023-06-02 NOTE — PROGRESS NOTE ADULT - ASSESSMENT
27 yr old M with Alcohol use went to [facility] on 5/[facility] with cough and was given tessalon and prednisone.  Presents with c/o weakness in b/l legs and R arm d/t pain. pt also c/o lower abd pain.  In ED- NIH 2, CTH neg. treated as steroid myopathy.    Right sided weakness s/p fall from ladder  had a fall at work  CTH neg, MRI neg  tte neg  - Trauma surg called for consult  - CT spine ordered urgent    ? Steroid myopathy  on steroid since 5/21  now off  Monitor     Hypokalemia with EKG changes, Hypophosphatemia. improved  QTc 648 on admission  K better  - monitor lytes  - repeat EKG    Leucocytosis with left shift  likely sec to steroids  Trend for improvement  no clinical signs of inf process    Daily alcohol use  - [facility] protocol     Metabolic acidosis, resolved  likely sec to alcoholism  IVF x 24 hrs  then intervene if not resolved    # Lovenox     27 yr old M with Alcohol use went to [facility] on 5/[facility] with cough and was given tessalon and prednisone.  Presents with c/o weakness in b/l legs and R arm d/t pain. pt also c/o lower abd pain.  In ED- NIH 2, CTH neg. treated as steroid myopathy.    Right sided weakness and back pain s/p fall from ladder  had a fall at work  CTH neg, MRI neg for cva  tte neg  - dc asa   - Trauma surg called for consult  - CT spine ordered urgent  - prn pain control    Lower abdominal pain  - leukocytosis but was on steroids recently  - will obain ct a/p with iv contrast    Drop in hemoglobin  not anemic  most likely dilutional from fluids as all cell lines lower  - repeat cbc now    ? Steroid myopathy  on steroid since 5/21  now off  Monitor     Hypokalemia with EKG changes, Hypophosphatemia. improved  QTc 648 on admission  K better  - monitor lytes  - repeat EKG    Leucocytosis with left shift  likely sec to steroids  Trend for improvement  no clinical signs of inf process    Daily alcohol use  - CIWA protocol     Metabolic acidosis, resolved    dvt ppx scds

## 2023-06-02 NOTE — PROGRESS NOTE ADULT - SUBJECTIVE AND OBJECTIVE BOX
[facility] Division of Hospital Medicine    Chief Complaint:  Right sided weakness    SUBJECTIVE / OVERNIGHT EVENTS: Patient admits to drinking beer daily at minimum 6 beers but more on the weekends. Patient states he is still weak on the right sided and has back pain. He says he fell from a ladder at work. States he had pain in the back shortly after falling.     Patient denies chest pain, SOB, abd pain, N/V, fever, chills, dysuria or any other complaints. All remainder ROS negative.     MEDICATIONS  (STANDING):  aspirin enteric coated 81 milliGRAM(s) Oral daily  atorvastatin 80 milliGRAM(s) Oral at bedtime  lactated ringers 1000 milliLiter(s) (75 mL/Hr) IV Continuous <Continuous>  pantoprazole    Tablet 40 milliGRAM(s) Oral before breakfast    MEDICATIONS  (PRN):  LORazepam   Injectable 2 milliGRAM(s) IV Push every 1 hour PRN CIWA-Ar score 8 or greater  oxycodone    5 mG/acetaminophen 325 mG 1 Tablet(s) Oral every 6 hours PRN Moderate Pain (4 - 6)        I&O's Summary    01 Jun 2023 07:01  -  02 Jun 2023 07:00  --------------------------------------------------------  IN: 750 mL / OUT: 0 mL / NET: 750 mL        PHYSICAL EXAM:  Vital Signs Last 24 Hrs  T(C): 36.4 (02 Jun 2023 07:35), Max: 36.6 (01 Jun 2023 20:18)  T(F): 97.5 (02 Jun 2023 07:35), Max: 97.9 (01 Jun 2023 21:33)  HR: 67 (02 Jun 2023 07:35) (67 - 80)  BP: 115/61 (02 Jun 2023 07:35) (100/65 - 132/78)  BP(mean): 94 (01 Jun 2023 20:18) (94 - 96)  RR: 16 (02 Jun 2023 07:35) (16 - 18)  SpO2: 97% (02 Jun 2023 07:35) (95% - 97%)    Parameters below as of 02 Jun 2023 07:35  Patient On (Oxygen Delivery Method): room air            CONSTITUTIONAL: NAD  ENMT: Moist oral mucosa, no pharyngeal injection or exudates;   RESPIRATORY: Normal respiratory effort; lungs are clear to auscultation bilaterally  CARDIOVASCULAR: Regular rate and rhythm, normal S1 and S2, no murmur/rub/gallop; No lower extremity edema;   ABDOMEN: Nontender to palpation, normoactive bowel sounds, no rebound/guarding;   MUSCLOSKELETAL:  no joint swelling or tenderness to palpation, tender to palpation of the lumbar spine.   PSYCH: A+O to person, place, and time; affect appropriate  NEUROLOGY: CN 2-12 are intact and symmetric; right arm weakness 4/5 and right leg 4/5.   SKIN: No rashes; no palpable lesions    LABS:                        14.7   14.71 )-----------( 299      ( 02 Jun 2023 05:07 )             43.7     06-02    140  |  103  |  11.5  ----------------------------<  99  4.7   |  25.0  |  0.70    Ca    9.3      02 Jun 2023 05:07  Phos  4.3     06-02  Mg     1.9     06-02    TPro  7.1  /  Alb  4.2  /  TBili  0.4  /  DBili  x   /  AST  26  /  ALT  41<H>  /  AlkPhos  61  06-01    PT/INR - ( 01 Jun 2023 00:15 )   PT: 13.6 sec;   INR: 1.17 ratio         PTT - ( 01 Jun 2023 00:15 )  PTT:27.6 sec  CARDIAC MARKERS ( 01 Jun 2023 00:15 )  x     / x     / 507 U/L / x     / 4.0 ng/mL          CAPILLARY BLOOD GLUCOSE            RADIOLOGY & ADDITIONAL TESTS:  Results Reviewed:   Imaging Personally Reviewed:  Electrocardiogram Personally Reviewed:

## 2023-06-02 NOTE — CONSULT NOTE ADULT - ASSESSMENT
ASSESSMENT: Patient is a 27y old m presenting with R sided weakness     PLAN:   - Would make NPO  - Recommend CT C/A/P with IV contrast and spine CT  - Further recommendations pending above  - Plan discussed with Attending, Dr. Velez stated

## 2023-06-03 VITALS
HEART RATE: 75 BPM | RESPIRATION RATE: 17 BRPM | SYSTOLIC BLOOD PRESSURE: 125 MMHG | TEMPERATURE: 98 F | OXYGEN SATURATION: 96 % | DIASTOLIC BLOOD PRESSURE: 68 MMHG

## 2023-06-03 LAB
ALBUMIN SERPL ELPH-MCNC: 4 G/DL — SIGNIFICANT CHANGE UP (ref 3.3–5.2)
ALP SERPL-CCNC: 78 U/L — SIGNIFICANT CHANGE UP (ref 40–120)
ALT FLD-CCNC: 41 U/L — HIGH
ANION GAP SERPL CALC-SCNC: 14 MMOL/L — SIGNIFICANT CHANGE UP (ref 5–17)
AST SERPL-CCNC: 24 U/L — SIGNIFICANT CHANGE UP
BASOPHILS # BLD AUTO: 0.07 K/UL — SIGNIFICANT CHANGE UP (ref 0–0.2)
BASOPHILS NFR BLD AUTO: 0.6 % — SIGNIFICANT CHANGE UP (ref 0–2)
BILIRUB SERPL-MCNC: 0.3 MG/DL — LOW (ref 0.4–2)
BUN SERPL-MCNC: 13.5 MG/DL — SIGNIFICANT CHANGE UP (ref 8–20)
CALCIUM SERPL-MCNC: 9 MG/DL — SIGNIFICANT CHANGE UP (ref 8.4–10.5)
CHLORIDE SERPL-SCNC: 99 MMOL/L — SIGNIFICANT CHANGE UP (ref 96–108)
CO2 SERPL-SCNC: 26 MMOL/L — SIGNIFICANT CHANGE UP (ref 22–29)
CREAT SERPL-MCNC: 0.78 MG/DL — SIGNIFICANT CHANGE UP (ref 0.5–1.3)
EGFR: 125 ML/MIN/1.73M2 — SIGNIFICANT CHANGE UP
EOSINOPHIL # BLD AUTO: 0.63 K/UL — HIGH (ref 0–0.5)
EOSINOPHIL NFR BLD AUTO: 5.1 % — SIGNIFICANT CHANGE UP (ref 0–6)
GLUCOSE SERPL-MCNC: 163 MG/DL — HIGH (ref 70–99)
HCT VFR BLD CALC: 45.7 % — SIGNIFICANT CHANGE UP (ref 39–50)
HGB BLD-MCNC: 15.4 G/DL — SIGNIFICANT CHANGE UP (ref 13–17)
IMM GRANULOCYTES NFR BLD AUTO: 0.8 % — SIGNIFICANT CHANGE UP (ref 0–0.9)
LYMPHOCYTES # BLD AUTO: 28.6 % — SIGNIFICANT CHANGE UP (ref 13–44)
LYMPHOCYTES # BLD AUTO: 3.57 K/UL — HIGH (ref 1–3.3)
MAGNESIUM SERPL-MCNC: 1.9 MG/DL — SIGNIFICANT CHANGE UP (ref 1.6–2.6)
MCHC RBC-ENTMCNC: 30.6 PG — SIGNIFICANT CHANGE UP (ref 27–34)
MCHC RBC-ENTMCNC: 33.7 GM/DL — SIGNIFICANT CHANGE UP (ref 32–36)
MCV RBC AUTO: 90.9 FL — SIGNIFICANT CHANGE UP (ref 80–100)
MONOCYTES # BLD AUTO: 1.02 K/UL — HIGH (ref 0–0.9)
MONOCYTES NFR BLD AUTO: 8.2 % — SIGNIFICANT CHANGE UP (ref 2–14)
NEUTROPHILS # BLD AUTO: 7.08 K/UL — SIGNIFICANT CHANGE UP (ref 1.8–7.4)
NEUTROPHILS NFR BLD AUTO: 56.7 % — SIGNIFICANT CHANGE UP (ref 43–77)
PLATELET # BLD AUTO: 305 K/UL — SIGNIFICANT CHANGE UP (ref 150–400)
POTASSIUM SERPL-MCNC: 4 MMOL/L — SIGNIFICANT CHANGE UP (ref 3.5–5.3)
POTASSIUM SERPL-SCNC: 4 MMOL/L — SIGNIFICANT CHANGE UP (ref 3.5–5.3)
PROT SERPL-MCNC: 7.1 G/DL — SIGNIFICANT CHANGE UP (ref 6.6–8.7)
RBC # BLD: 5.03 M/UL — SIGNIFICANT CHANGE UP (ref 4.2–5.8)
RBC # FLD: 12.5 % — SIGNIFICANT CHANGE UP (ref 10.3–14.5)
SODIUM SERPL-SCNC: 139 MMOL/L — SIGNIFICANT CHANGE UP (ref 135–145)
WBC # BLD: 12.47 K/UL — HIGH (ref 3.8–10.5)
WBC # FLD AUTO: 12.47 K/UL — HIGH (ref 3.8–10.5)

## 2023-06-03 PROCEDURE — 82962 GLUCOSE BLOOD TEST: CPT

## 2023-06-03 PROCEDURE — 72125 CT NECK SPINE W/O DYE: CPT

## 2023-06-03 PROCEDURE — 97530 THERAPEUTIC ACTIVITIES: CPT

## 2023-06-03 PROCEDURE — G1004: CPT

## 2023-06-03 PROCEDURE — 80053 COMPREHEN METABOLIC PANEL: CPT

## 2023-06-03 PROCEDURE — 85027 COMPLETE CBC AUTOMATED: CPT

## 2023-06-03 PROCEDURE — 93005 ELECTROCARDIOGRAM TRACING: CPT

## 2023-06-03 PROCEDURE — 80048 BASIC METABOLIC PNL TOTAL CA: CPT

## 2023-06-03 PROCEDURE — 82553 CREATINE MB FRACTION: CPT

## 2023-06-03 PROCEDURE — 83735 ASSAY OF MAGNESIUM: CPT

## 2023-06-03 PROCEDURE — 99239 HOSP IP/OBS DSCHRG MGMT >30: CPT

## 2023-06-03 PROCEDURE — 96376 TX/PRO/DX INJ SAME DRUG ADON: CPT

## 2023-06-03 PROCEDURE — 96374 THER/PROPH/DIAG INJ IV PUSH: CPT

## 2023-06-03 PROCEDURE — 70551 MRI BRAIN STEM W/O DYE: CPT

## 2023-06-03 PROCEDURE — 84132 ASSAY OF SERUM POTASSIUM: CPT

## 2023-06-03 PROCEDURE — 71260 CT THORAX DX C+: CPT

## 2023-06-03 PROCEDURE — 70450 CT HEAD/BRAIN W/O DYE: CPT | Mod: MG

## 2023-06-03 PROCEDURE — 80061 LIPID PANEL: CPT

## 2023-06-03 PROCEDURE — 85025 COMPLETE CBC W/AUTO DIFF WBC: CPT

## 2023-06-03 PROCEDURE — C8929: CPT

## 2023-06-03 PROCEDURE — 83036 HEMOGLOBIN GLYCOSYLATED A1C: CPT

## 2023-06-03 PROCEDURE — 96375 TX/PRO/DX INJ NEW DRUG ADDON: CPT

## 2023-06-03 PROCEDURE — 82550 ASSAY OF CK (CPK): CPT

## 2023-06-03 PROCEDURE — 74177 CT ABD & PELVIS W/CONTRAST: CPT

## 2023-06-03 PROCEDURE — 97535 SELF CARE MNGMENT TRAINING: CPT

## 2023-06-03 PROCEDURE — 99285 EMERGENCY DEPT VISIT HI MDM: CPT

## 2023-06-03 PROCEDURE — 84100 ASSAY OF PHOSPHORUS: CPT

## 2023-06-03 PROCEDURE — 36415 COLL VENOUS BLD VENIPUNCTURE: CPT

## 2023-06-03 PROCEDURE — 85730 THROMBOPLASTIN TIME PARTIAL: CPT

## 2023-06-03 PROCEDURE — 85610 PROTHROMBIN TIME: CPT

## 2023-06-03 RX ORDER — CYCLOBENZAPRINE HYDROCHLORIDE 10 MG/1
1 TABLET, FILM COATED ORAL
Qty: 15 | Refills: 0
Start: 2023-06-03 | End: 2023-06-07

## 2023-06-03 RX ORDER — ACETAMINOPHEN 500 MG
2 TABLET ORAL
Qty: 0 | Refills: 0 | DISCHARGE

## 2023-06-03 RX ADMIN — PANTOPRAZOLE SODIUM 40 MILLIGRAM(S): 20 TABLET, DELAYED RELEASE ORAL at 05:10

## 2023-06-03 NOTE — DISCHARGE NOTE PROVIDER - NSDCCPCAREPLAN_GEN_ALL_CORE_FT
PRINCIPAL DISCHARGE DIAGNOSIS  Diagnosis: Right sided weakness  Assessment and Plan of Treatment: CVA ruled out.  Follow up with PMD / Neuro in 1 week.      SECONDARY DISCHARGE DIAGNOSES  Diagnosis: Fall  Assessment and Plan of Treatment: Acute injury ruled out.  Unlikely steroid induced myopathy.   Follow up with PMD in 1 week.    Diagnosis: Electrolyte abnormality  Assessment and Plan of Treatment: Hypokalemia / Hypophosphatemia repleted.

## 2023-06-03 NOTE — PROGRESS NOTE ADULT - SUBJECTIVE AND OBJECTIVE BOX
SUBJECTIVE/24 hour events:  27 yr old M with Alcohol use went to [facility] on 5/21 with cough and was given tessalon and prednisone. He has been taking it. Now presents with c/o weakness in b/l legs and R arm d/t pain. pt also c/o lower abd pain. Per significant other  pt was told he had something wrong with his liver, in the past. Pain started around last evening when he was coming home from work and wasn't able to drive and needed help showering when he got home. In ED- NIH 2, CTH neg. treated as steroid myopathy. Trauma called for fall off ladder 2/2 fall, c/o low back pain and abdominal pain. Patient had no acute events overnight, as recommended by trauma, ct scans of the abdomen and pelvis, and cervical spine performed and are negative for injury.           Vital Signs Last 24 Hrs  T(C): 36.8 (03 Jun 2023 00:46), Max: 36.9 (02 Jun 2023 21:07)  T(F): 98.2 (03 Jun 2023 00:46), Max: 98.5 (02 Jun 2023 21:07)  HR: 101 (03 Jun 2023 00:46) (67 - 101)  BP: 115/60 (03 Jun 2023 00:46) (104/64 - 116/72)  BP(mean): --  RR: 18 (03 Jun 2023 00:46) (16 - 18)  SpO2: 98% (03 Jun 2023 00:46) (95% - 98%)    Parameters below as of 03 Jun 2023 00:46  Patient On (Oxygen Delivery Method): room air      Drug Dosing Weight    Weight (kg): 93.8 (01 Jun 2023 01:11)  I&O's Detail    01 Jun 2023 07:01  -  02 Jun 2023 07:00  --------------------------------------------------------  IN:    Lactated Ringers w/ Additives: 750 mL  Total IN: 750 mL    OUT:  Total OUT: 0 mL    Total NET: 750 mL        Allergies    No Known Allergies    Intolerances                              14.7   11.72 )-----------( 299      ( 02 Jun 2023 10:24 )             42.7   06-02    140  |  103  |  11.5  ----------------------------<  99  4.7   |  25.0  |  0.70    Ca    9.3      02 Jun 2023 05:07  Phos  4.3     06-02  Mg     1.9     06-02    TPro  7.1  /  Alb  4.2  /  TBili  0.4  /  DBili  x   /  AST  26  /  ALT  41<H>  /  AlkPhos  61  06-01      ROS:    PHYSICAL EXAM:   General: resting comfortably   HEENT: Normocephalic, atraumatic  Neck: Soft, midline trachea.  Chest: No chest wall tenderness.   Cardiac: S1, S2, RRR  Respiratory: Bilateral breath sounds, clear and equal bilaterally  Abdomen: Soft, non-distended, tender to palpation in lower abdomen, no rebound, no guarding  Pelvis: Stable, non-tender, no ecchymosis  Ext: palp radial b/l UE, b/l DP/PT, proximal BUE and BLE tender to palpation  Back: tender to palpation in low back, no palpable runoff/stepoff/deformity        MEDICATIONS  (STANDING):  atorvastatin 80 milliGRAM(s) Oral at bedtime  lactated ringers 1000 milliLiter(s) (75 mL/Hr) IV Continuous <Continuous>  pantoprazole    Tablet 40 milliGRAM(s) Oral before breakfast    MEDICATIONS  (PRN):  LORazepam   Injectable 2 milliGRAM(s) IV Push every 1 hour PRN CIWA-Ar score 8 or greater  oxycodone    5 mG/acetaminophen 325 mG 1 Tablet(s) Oral every 6 hours PRN Moderate Pain (4 - 6)      RADIOLOGY STUDIES:    CULTURES:         SUBJECTIVE/24 hour events:  27 yr old M with Alcohol use went to [facility] on 5/21 with cough and was given tessalon and prednisone. He has been taking it. Now presents with c/o weakness in b/l legs and R arm d/t pain. pt also c/o lower abd pain. Per significant other  pt was told he had something wrong with his liver, in the past. Pain started around last evening when he was coming home from work and wasn't able to drive and needed help showering when he got home. In ED- NIH 2, CTH neg. treated as steroid myopathy. Trauma called for fall off ladder 2/2 fall, c/o low back pain and abdominal pain. Patient had no acute events overnight, appears CIWA low and no meds given, has no pain issues.  As  recommended by trauma, ct scans of the abdomen and pelvis, and cervical spine performed and are negative for injury.           Vital Signs Last 24 Hrs  T(C): 36.8 (03 Jun 2023 00:46), Max: 36.9 (02 Jun 2023 21:07)  T(F): 98.2 (03 Jun 2023 00:46), Max: 98.5 (02 Jun 2023 21:07)  HR: 101 (03 Jun 2023 00:46) (67 - 101)  BP: 115/60 (03 Jun 2023 00:46) (104/64 - 116/72)  BP(mean): --  RR: 18 (03 Jun 2023 00:46) (16 - 18)  SpO2: 98% (03 Jun 2023 00:46) (95% - 98%)    Parameters below as of 03 Jun 2023 00:46  Patient On (Oxygen Delivery Method): room air      Drug Dosing Weight    Weight (kg): 93.8 (01 Jun 2023 01:11)  I&O's Detail    01 Jun 2023 07:01  -  02 Jun 2023 07:00  --------------------------------------------------------  IN:    Lactated Ringers w/ Additives: 750 mL  Total IN: 750 mL    OUT:  Total OUT: 0 mL    Total NET: 750 mL        Allergies    No Known Allergies    Intolerances                              14.7   11.72 )-----------( 299      ( 02 Jun 2023 10:24 )             42.7   06-02    140  |  103  |  11.5  ----------------------------<  99  4.7   |  25.0  |  0.70    Ca    9.3      02 Jun 2023 05:07  Phos  4.3     06-02  Mg     1.9     06-02    TPro  7.1  /  Alb  4.2  /  TBili  0.4  /  DBili  x   /  AST  26  /  ALT  41<H>  /  AlkPhos  61  06-01      ROS:    PHYSICAL EXAM:   General: resting comfortably   HEENT: Normocephalic, atraumatic  Neck: Soft, midline trachea.  Chest: No chest wall tenderness.   Cardiac: S1, S2, RRR  Respiratory: Bilateral breath sounds, clear and equal bilaterally  Abdomen: Soft, non-distended, tender to palpation in lower abdomen, no rebound, no guarding  Pelvis: Stable, non-tender, no ecchymosis  Ext: palp radial b/l UE, b/l DP/PT, proximal BUE and BLE tender to palpation  Back: tender to palpation in low back, no palpable runoff/stepoff/deformity        MEDICATIONS  (STANDING):  atorvastatin 80 milliGRAM(s) Oral at bedtime  lactated ringers 1000 milliLiter(s) (75 mL/Hr) IV Continuous <Continuous>  pantoprazole    Tablet 40 milliGRAM(s) Oral before breakfast    MEDICATIONS  (PRN):  LORazepam   Injectable 2 milliGRAM(s) IV Push every 1 hour PRN CIWA-Ar score 8 or greater  oxycodone    5 mG/acetaminophen 325 mG 1 Tablet(s) Oral every 6 hours PRN Moderate Pain (4 - 6)      RADIOLOGY STUDIES:    CULTURES:

## 2023-06-03 NOTE — DISCHARGE NOTE PROVIDER - ATTENDING DISCHARGE PHYSICAL EXAMINATION:
Vital Signs   T(C): 37 (03 Jun 2023 12:21), Max: 37 (03 Jun 2023 12:21)  T(F): 98.6 (03 Jun 2023 12:21), Max: 98.6 (03 Jun 2023 12:21)  HR: 77 (03 Jun 2023 12:21) (70 - 101)  BP: 129/65 (03 Jun 2023 12:21) (107/70 - 130/73)  RR: 18 (03 Jun 2023 12:21) (18 - 18)  SpO2: 95% (03 Jun 2023 12:21) (95% - 98%)  Parameters below as of 03 Jun 2023 12:21  Patient On (Oxygen Delivery Method): room air  General: Young male sitting in bed comfortably. No acute distress  HEENT: EOMI. Clear conjunctivae. Moist mucus membrane  Neck: Supple.   Chest: CTA bilaterally - no wheezing, rales or rhonchi.   Heart: Normal S1 & S2 with RRR.   Abdomen: Non distended. Soft. Non-tender. + BS  Ext: No pedal edema. No calf tenderness   Neuro: AAO x 3. No focal deficit. No speech disorder.  Skin: Warm and Dry  Psychiatry: Normal mood and affect

## 2023-06-03 NOTE — DISCHARGE NOTE PROVIDER - HOSPITAL COURSE
Patient was admitted with Right Sided Weakness. Stroke was ruled out. He had a fall from ladder. Seen and cleared by Trauma Team -- no acute injury. He was placed on [facility] Protocol for history of Alcoholism and was monitored closely. He was suspected to have Steroid Myopathy however it's less likely. He is advised to follow up with PMD / Neuro.  He is advised extensively to stop alcohol. He is stable for discharge.

## 2023-06-03 NOTE — DISCHARGE NOTE PROVIDER - NSDCMRMEDTOKEN_GEN_ALL_CORE_FT
cyclobenzaprine 10 mg oral tablet: 1 tab(s) orally 3 times a day as needed for  muscle spasm  Tylenol 325 mg oral tablet: 2 tab(s) orally every 6 hours as needed for Pain

## 2023-06-03 NOTE — DISCHARGE NOTE NURSING/CASE MANAGEMENT/SOCIAL WORK - NSDCPEFALRISK_GEN_ALL_CORE
For information on Fall & Injury Prevention, visit: [email or web address] OR  [email or web address] OR  https://www.cdc.gov/steadi/patient.html

## 2023-06-03 NOTE — DISCHARGE NOTE PROVIDER - CARE PROVIDER_API CALL
Lakia Moore  [address] [address]  Phone: [phone]  Fax: [phone]  Established Patient  Follow Up Time: 1 week

## 2023-06-03 NOTE — DISCHARGE NOTE NURSING/CASE MANAGEMENT/SOCIAL WORK - PATIENT PORTAL LINK FT
You can access the FollowMyHealth Patient Portal offered by [facility] by registering at the following website: [email or web address] By joining [email or web address] [email or web address] portal, you will also be able to view your health information using other applications (apps) compatible with our system.

## 2023-06-03 NOTE — PROGRESS NOTE ADULT - ASSESSMENT
Patient is a 28 y/o m s/p fall off ladder 2/2 weakness, trauma called for consultation, for complaints of lower back and abdominal pain. CT scans of the abdomen pelvis and cervical spine performed as recommended, and negative.  plan:  discontinue NPO, start regular diet, ( already done)   Pain control  would get a pt /ot, PM&R consult   all care per primary team  Patient is a 28 y/o m s/p fall off ladder 2/2 weakness, trauma called for consultation, for complaints of lower back and abdominal pain. CT scans of the abdomen pelvis and cervical spine performed as recommended, and negative.  plan:  discontinue NPO, start regular diet, ( already done)   Pain control  will no longer actively follow, please recall as needed. no injuries   all care per primary team

## 2023-10-27 NOTE — ED PROVIDER NOTE - CONSTITUTIONAL NEGATIVE STATEMENT, MLM
Drive Safe... Drive Alive     Sleep health profoundly affects your health, mood, and your safety. 33% of the population (one in three of us) is not getting enough sleep and many have a sleep disorder. Not getting enough sleep or having an untreated / undertreated sleep condition may make us sleepy without even knowing it. In fact, our driving could be dramatically impaired due to our sleep health. As your provider, here are some things I would like you to know about driving:     Here are some warning signs for impairment and dangerous drowsy driving:              -Having been awake more than 16 hours               -Looking tired               -Eyelid drooping              -Head nodding (it could be too late at this point)              -Driving for more than 30 minutes     Some things you could do to make the driving safer if you are experiencing some drowsiness:              -Stop driving and rest              -Call for transportation              -Make sure your sleep disorder is adequately treated     Some things that have been shown NOT to work when experiencing drowsiness while driving:              -Turning on the radio              -Opening windows              -Eating any  distracting  /  entertaining  foods (e.g., sunflower seeds, candy, or any other)              -Talking on the phone      Your decision may not only impact your life, but also the life of others. Please, remember to drive safe for yourself and all of us.   Your Body mass index is 34.04 kg/m .  Weight management is a personal decision.  If you are interested in exploring weight loss strategies, the following discussion covers the approaches that may be successful. Body mass index (BMI) is one way to tell whether you are at a healthy weight, overweight, or obese. It measures your weight in relation to your height.  A BMI of 18.5 to 24.9 is in the healthy range. A person with a BMI of 25 to 29.9 is considered overweight, and someone with a BMI  of 30 or greater is considered obese. More than two-thirds of American adults are considered overweight or obese.  Being overweight or obese increases the risk for further weight gain. Excess weight may lead to heart disease and diabetes.  Creating and following plans for healthy eating and physical activity may help you improve your health.  Weight control is part of healthy lifestyle and includes exercise, emotional health, and healthy eating habits. Careful eating habits lifelong are the mainstay of weight control. Though there are significant health benefits from weight loss, long-term weight loss with diet alone may be very difficult to achieve- studies show long-term success with dietary management in less than 10% of people. Attaining a healthy weight may be especially difficult to achieve in those with severe obesity. In some cases, medications, devices and surgical management might be considered.  What can you do?  If you are overweight or obese and are interested in methods for weight loss, you should discuss this with your provider.   Consider reducing daily calorie intake by 500 calories.   Keep a food journal.   Avoiding skipping meals, consider cutting portions instead.    Diet combined with exercise helps maintain muscle while optimizing fat loss. Strength training is particularly important for building and maintaining muscle mass. Exercise helps reduce stress, increase energy, and improves fitness. Increasing exercise without diet control, however, may not burn enough calories to loose weight.     Start walking three days a week 10-20 minutes at a time  Work towards walking thirty minutes five days a week   Eventually, increase the speed of your walking for 1-2 minutes at time    In addition, we recommend that you review healthy lifestyles and methods for weight loss available through the National Institutes of Health patient information sites:  http://win.niddk.nih.gov/publications/index.htm    And  look into health and wellness programs that may be available through your health insurance provider, employer, local community center, or susan club.           no fever and no chills.

## 2024-11-04 NOTE — ED PROVIDER NOTE - IV ALTEPLASE ADMIN OUTSIDE HIDDEN
Encounter addended by: Jeri Rutherford RN on: 11/1/2024 7:55 AM   Actions taken: Clinical Note Signed
Encounter addended by: Ne Zavala MD on: 11/4/2024 4:11 PM   Actions taken: Clinical Note Signed
show